# Patient Record
Sex: FEMALE | Race: WHITE
[De-identification: names, ages, dates, MRNs, and addresses within clinical notes are randomized per-mention and may not be internally consistent; named-entity substitution may affect disease eponyms.]

---

## 2017-02-01 ENCOUNTER — HOSPITAL ENCOUNTER (OUTPATIENT)
Dept: HOSPITAL 58 - RAD | Age: 67
End: 2017-02-01
Attending: FAMILY MEDICINE

## 2017-02-01 VITALS — BODY MASS INDEX: 31.2 KG/M2

## 2017-02-01 DIAGNOSIS — Z12.31: Primary | ICD-10-CM

## 2017-02-01 NOTE — MAMMO
EXAM:  Digital screening mammogram 

  

HISTORY:  Screening 

  

COMPARISON:  04/29/2011 

  

FINDINGS:  Digital  MLO and CC views of the right and left breast were performed.  There are scatter
ed fibroglandular densities.  There is no evidence for mass, asymmetry, distortion, or suspicious ca
lcifications in either breast. 

  

IMPRESSION: 

1.  No evidence of malignancy in the right or left breast. 

2.  Annual screening mammogram is recommended in one year. 

  

BIRADS category 1, negative examination

## 2018-01-10 ENCOUNTER — HOSPITAL ENCOUNTER (EMERGENCY)
Dept: HOSPITAL 58 - ED | Age: 68
Discharge: HOME | End: 2018-01-10

## 2018-01-10 VITALS — BODY MASS INDEX: 31.2 KG/M2

## 2018-01-10 VITALS — SYSTOLIC BLOOD PRESSURE: 128 MMHG | DIASTOLIC BLOOD PRESSURE: 76 MMHG | TEMPERATURE: 99.8 F

## 2018-01-10 DIAGNOSIS — J09.X2: Primary | ICD-10-CM

## 2018-01-10 PROCEDURE — 87502 INFLUENZA DNA AMP PROBE: CPT

## 2018-01-10 PROCEDURE — 99283 EMERGENCY DEPT VISIT LOW MDM: CPT

## 2018-01-10 PROCEDURE — 94640 AIRWAY INHALATION TREATMENT: CPT

## 2018-01-10 PROCEDURE — 87651 STREP A DNA AMP PROBE: CPT

## 2018-01-10 NOTE — ED.PDOC
General


ED Provider: 


Dr. DARIUSZ PAREKH





Chief Complaint: Respiratory Complaint


Stated Complaint: cough, flu like symp


Time Seen by Physician: 08:17


Mode of Arrival: Walk-In


Information Source: Patient


Exam Limitations: No limitations


Primary Care Provider: 


LISSETH CRONIN





Nursing and Triage Documentation Reviewed and Agree: Yes


Reviewed sepsis parameters & appropriate labs ordered?: Yes


System Inflammatory Response Syndrome: Not Applicable


Sepsis Protocol: 


For patient's 13 years and over:





Temp is 96.8 and below  and greater


Pulse >90 BPM


Resp >20/minute


Acutely Altered Mental Status





Are patient's symptoms suggestive of a new infection, such as:


   -Pneumonia


   -Skin, Soft Tissue


   -Endocarditis


   -UTI


   -Bone, Joint Infection


   -Implantable Device


   -Acute Abdominal Infection


   -Wound Infection


   -Meningitis


   -Blood Stream Catheter Infection


   -Unknown





System Inflammatory Response Syndrome: Not Applicable





Respiratory Complaint Exam





- Respiratory Complaint/Exam


Onset/Duration: 1 day


Symptoms Are: Still present


Timing: Intermittent


Initial Severity: Mild


Current Severity: Mild


Location: Nose, Throat, Chest


Character: Reports: Non-productive cough, Dry cough


Aggravating: Reports: URI


Alleviating: Reports: Bronchodilators


Associated Signs and Symptoms: Reports: URI, Nasal congestion, Sore throat


Related History: Reports: Similar episode


History of Healthcare-Acquired Pneumonia: No


Related Surgical History: Reports: None


Pulmonary Embolism Risk Factors: None


Cardiac Risk Factors: Reports: Hypertension, CHF


Pseudomonas Risk Factors: Reports: None


Tuberculosis Risk Factors: Reports: None


Status Asthmaticus Risk Factors: Reports: None


Home Oxygen Use: No


Recent Stress Test: No


Recent Echo/LV Function: No


Current Antibiotic Use: No


Current Asthma Medication Use: No


Respiratory Distress: None


Inadequate Respiratory Effort: No


Dysphagia Present: No


Stridor Present: No


JVD Present: No


Accessory Muscle Use: No


Retractions: Not Present


Diminished Breath Sounds: No


Sinus Tenderness: None


Grunting Respirations: No


Kussmaul Respirations: No


Differential Diagnoses: Pneumonia, Bronchitis





Review of Systems





- Review Of Systems


Constitutional: Reports: Chills, Malaise


Eyes: Reports: No symptoms


Ears, Nose, Mouth, Throat: Reports: Throat pain


Respiratory: Reports: Cough


Cardiac: Reports: No symptoms


GI: Reports: No symptoms


: Reports: No symptoms


Musculoskeletal: Reports: No symptoms


Skin: Reports: No symptoms


Neurological: Reports: No symptoms


Endocrine: Reports: No symptoms


Hematologic/Lymphatic: Reports: No symptoms


All Other Systems: Reviewed and Negative





Past Medical History





- Past Medical History


Previously Healthy: Yes


Endocrine: Reports: None


Cardiovascular: Reports: Hypertension, CHF


Respiratory: Reports: None


Hematological: Reports: None


Gastrointestinal: Reports: None


Genitourinary: Reports: None


Neuro/Psych: Reports: None


Musculoskeletal: Reports: None


Cancer: Reports: None


Last Menstrual Period: n/a





- Surgical History


General Surgical History: Reports: 





- Family History


Family History: Reports: Unknown





- Social History


Smoking Status: Never smoker


Hx Substance Use: No


Alcohol Screening: Occasionally





Physical Exam





- Physical Exam


Appearance: Ill-appearing


Ill-appearing: Mild


Eyes: SHOAIB, EOMI, Conjunctiva clear


ENT: Erythema


Respiratory: Rhonchi


Cardiovascular: RRR, Pulses normal, No rub, No murmur


GI/: Soft, Nontender, No masses, Bowel sounds normal, No Organomegaly


Musculoskeletal: Normal strength, ROM intact, No edema, No calf tenderness


Skin: Warm, Dry, Normal color


Neurological: Sensation intact, Motor intact, Reflexes intact, Cranial nerves 

intact, Alert, Oriented


Psychiatric: Affect appropriate, Mood appropriate





Interpretation





- Radiology Interpretation


Radiology Interpretation By: Radiologist


Radiology Results: No acute changes





Critical Care Note





- Critical Care Note


Total Time (mins): 0





Course





- Course


Orders, Labs, Meds: 





Lab Review











  01/10/18





  08:34


 


Influenza A (Rapid)  Positive by naat H


 


Influenza B (Rapid)  Negative by naat








Orders











 Category Date Time Status


 


 NEBULIZER TREATMENT Stat CARDIO  01/10/18 08:30 Ordered


 


 MOLECULAR FLU A/B Stat LAB  01/10/18 08:29 Uncollected


 


 MOLECULAR GROUP A STREP Stat LAB  01/10/18 08:29 Uncollected


 


 Ipratropium/Albuterol Neb [Duoneb] MEDS  01/10/18 08:29 Stat





 1 vial NEB ONCE STA   


 


 Prednisone MEDS  01/10/18 08:30 Stat





 40 mg PO ONCE STA   


 


 CHEST, 2 VIEWS PA & LAT Stat RADS  01/10/18 08:28 Ordered








Medications














Discontinued Medications














Generic Name Dose Route Start Last Admin





  Trade Name Freq  PRN Reason Stop Dose Admin


 


Albuterol/Ipratropium  1 vial  01/10/18 08:29  01/10/18 08:42





  Duoneb  NEB  01/10/18 08:30  1 vial





  ONCE STA   Administration


 


Prednisone  40 mg  01/10/18 08:30  01/10/18 08:53





  Prednisone  PO  01/10/18 08:31  40 mg





  ONCE STA   Administration











Vital Signs: 





 











  Temp Pulse Resp BP Pulse Ox


 


 01/10/18 08:17  99.8 F H  86  16  128/76  95














Departure





- Departure


Time of Disposition: 09:24


Disposition: HOME SELF-CARE


Discharge Problem: 


 Influenza





Instructions:  Influenza (ED)


Condition: Good


Pt referred to PMD for follow-up: Yes


Additional Instructions: 


Please call your Family Physician as soon as possible to schedule a follow-up 

appointment.


Allergies/Adverse Reactions: 


Allergies





doxycycline Adverse Reaction (Verified 01/10/18 08:20)


 Nausea


Statins-Hmg-Coa Reductase Inhibitor Adverse Reaction (Verified 01/10/18 08:20)


 WEAKNESS, LEG TINGLING


Sulfa (Sulfonamide Antibiotics) Adverse Reaction (Verified 01/10/18 08:20)


 Rash








Home Medications: 


Ambulatory Orders





Furosemide [Furosemide] 20 mg PO DAILY 10/29/14 


Losartan Potassium [Losartan Potassium] 50 mg PO BS 10/29/14 


Olopatadine HCl [Pataday] 2.5 drop EACHEYE DAILY PRN 10/29/14 


Aspirin [Aspirin EC] 81 mg PO DAILYWM 16 


Calcium Carbonate/Vitamin D3 [Calcium 600 + Vit D 400 Tablet] 1 tab PO BID  


Hydrocodone Bit/Acetaminophen [Norco 7.5-325] 1 tab PO Q4H PRN 16 


Amoxicillin 500 mg PO Q8HR #21 tablet 01/10/18 


Hydrocodone/Chlorphen Polis [Tussionex] 5 ml PO Q12HR 5 Days  disp.syrin 01/10/

18 








Disposition Discussed With: Patient

## 2018-01-10 NOTE — DI
EXAM:  Two views of the chest. 

  

History:  Cough. 

  

Comparison:  Chest radiograph 10/16/2016 

  

Findings:  Heart size is normal.  No focal consolidation.  No appreciable pleural fluid and no pneumo
thorax.  No acute osseous abnormalities.  Atherosclerotic vascular calcifications. 

  

Impression:  No acute cardiopulmonary process

## 2018-02-20 ENCOUNTER — HOSPITAL ENCOUNTER (OUTPATIENT)
Dept: HOSPITAL 58 - RAD | Age: 68
Discharge: HOME | End: 2018-02-20
Attending: FAMILY MEDICINE

## 2018-02-20 VITALS — BODY MASS INDEX: 31.2 KG/M2

## 2018-02-20 DIAGNOSIS — Z78.0: ICD-10-CM

## 2018-02-20 DIAGNOSIS — Z12.31: Primary | ICD-10-CM

## 2018-02-20 PROCEDURE — 77067 SCR MAMMO BI INCL CAD: CPT

## 2018-02-20 NOTE — DEXA
EXAM:  Bone densitometry. 

  

History:  Menopause 

  

Findings: 

  

Evaluation of the lumbar spine reveals a total bone mineral density of 1.302 grams per centimeter squ
ared with T-score of 1.0. 

  

Evaluation of the left hip reveals a total bone mineral density of 1.004 grams per centimeter squared
 with T-score of 0.0. 

  

Evaluation of the right hip reveals a total bone mineral density of 1.026 grams per centimeter square
d with T-score of 0.1 

  

Impression:  Normal bone mineral density of the lumbar spine and left hip.

## 2018-02-21 NOTE — MAMMO
EXAM:  Bilateral digital screening mammogram (2-D and 3-D) 

  

History:  Screening 

  

Comparison:  Bilateral mammogram 02/01/2017 

  

Findings:  MLO and CC views of bilateral breasts demonstrate scattered fibroglandular breast parenchy
ma.  CAD was reviewed by the radiologist.  Tomosynthesis was performed.  Stable bilateral breast calc
ifications.  Stable benign appearing bilateral nodular densities. There are no developing masses, no 
suspicious microcalcifications and no architectural distortions 

  

Impression:  Benign stable mammogram.  Recommend followup routine screening mammography in 1 year. 

  

BIRADS 2

## 2018-05-08 ENCOUNTER — HOSPITAL ENCOUNTER (OUTPATIENT)
Dept: HOSPITAL 58 - RAD | Age: 68
Discharge: HOME | End: 2018-05-08
Attending: FAMILY MEDICINE

## 2018-05-08 VITALS — BODY MASS INDEX: 31.2 KG/M2

## 2018-05-08 DIAGNOSIS — M25.561: Primary | ICD-10-CM

## 2018-05-08 DIAGNOSIS — M25.562: ICD-10-CM

## 2018-05-08 NOTE — DI
EXAM:  Three views of the right knee. 

  

History:  Right knee pain. 

  

Findings:  No acute fracture or dislocation.  Chondrocalcinosis.  Mild to moderate tricompartmental j
oint space narrowing with small osteophytes and most significant involving the patellofemoral compart
ment.  Anterior subcutaneous edema.  Mild patellar enthesiopathy 

  

Impression: 

1.  No acute osseous abnormality. 

2.  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 

3.  Anterior subcutaneous edema

## 2018-05-08 NOTE — DI
Exam:  Left knee three-view 

  

History:  Knee pain 

  

Findings / impression:  No acute bony or articular abnormality.  Mild tricompartmental osteoarthritic
 change manifest by marginal osteophyte ptosis probably most prominent patellofemoral.  No obvious georges
int effusion.

## 2019-02-25 ENCOUNTER — HOSPITAL ENCOUNTER (OUTPATIENT)
Dept: HOSPITAL 58 - RAD | Age: 69
Discharge: HOME | End: 2019-02-25
Attending: FAMILY MEDICINE

## 2019-02-25 VITALS — BODY MASS INDEX: 31.2 KG/M2

## 2019-02-25 DIAGNOSIS — Z12.31: Primary | ICD-10-CM

## 2019-02-25 NOTE — MAMMO
EXAM:  Bilateral digital screening mammogram (2-D and 3-D) 

  

History:  Screening 

  

Comparison:  Bilateral mammogram 02/20/2018 

  

Findings:  MLO and CC views of bilateral breasts demonstrate scattered fibroglandular breast parenchy
ma.  CAD was reviewed by the radiologist.  Tomosynthesis was performed.  Stable benign bilateral juan
st calcifications.  There are no dominant masses, no suspicious microcalcifications and no architectu
ral distortions 

  

Impression:  Benign stable mammogram.  Recommend followup routine screening mammography in 1 year. 

  

BIRADS 2, benign